# Patient Record
Sex: FEMALE | Race: WHITE | NOT HISPANIC OR LATINO | Employment: OTHER | URBAN - METROPOLITAN AREA
[De-identification: names, ages, dates, MRNs, and addresses within clinical notes are randomized per-mention and may not be internally consistent; named-entity substitution may affect disease eponyms.]

---

## 2021-04-08 DIAGNOSIS — Z23 ENCOUNTER FOR IMMUNIZATION: ICD-10-CM

## 2022-04-18 ENCOUNTER — EVALUATION (OUTPATIENT)
Dept: PHYSICAL THERAPY | Facility: CLINIC | Age: 72
End: 2022-04-18
Payer: COMMERCIAL

## 2022-04-18 DIAGNOSIS — M92.62 HAGLUND'S DEFORMITY OF LEFT HEEL: Primary | ICD-10-CM

## 2022-04-18 PROCEDURE — 97161 PT EVAL LOW COMPLEX 20 MIN: CPT

## 2022-04-18 PROCEDURE — 97110 THERAPEUTIC EXERCISES: CPT

## 2022-04-18 NOTE — LETTER
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        Dear Dr Damian Antis: Thank you for your recent referral of Deacon Ledesma  Please review the attached evaluation summary from Lenora's recent visit  Please verify that you agree with the plan of care by signing the attached order  If you have any questions or concerns, please do not hesitate to call  I sincerely appreciate the opportunity to share in the care of one of your patients and hope to have another opportunity to work with you in the near future  Sincerely,    Nikia Tyson, PT      Referring Provider:      I certify that I have read the below Plan of Care and certify the need for these services furnished under this plan of treatment while under my care  Radamesdion Villavicenciofly, [address]  [address] [place] [address]  Via Fax: [phone]          PT Evaluation     Today's date: 2022  Patient name: Deacon Ledesma  : 1950  MRN: [id number]  Referring provider: Curtis Ochoa DPM  Dx:   Encounter Diagnosis     ICD-10-CM    1  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                   Assessment  Assessment details: Abhilash Luz is a 74 yo female presenting with complaints of L heel pain  Pt demonstrates decreased L ankle strength in all planes, full but painful L ankle A/PROM, and WBing intolerance/abnormal gait leading to limitations with long duration standing, walking, stair navigation, bending, golfing and ADLs  Pt's clinical presentation aligns with dx of Tri's Deformity  Pt would benefit from skilled physical therapy interventions in order to address the stated impairments, decrease pain with function and increase activity tolerance in order to improve quality of life   No further referral appears necessary at this time based on examination results  Prognosis is good given HEP compliance and PT 1-2/wk   Positive prognostic indicators include positive attitude toward recovery  Please contact me if you have any questions or recommendations  Thank you for the opportunity to share in Lenora's care      Impairments: abnormal gait, abnormal or restricted ROM, activity intolerance, impaired physical strength, lacks appropriate home exercise program, pain with function and weight-bearing intolerance    Symptom irritability: lowBarriers to therapy: None  Understanding of Dx/Px/POC: good   Prognosis: good    Goals  Short Term Goals:  Pt will report decreased levels of pain by at least 2 subjective ratings in 4 weeks  Pt will demonstrate improved pain free ROM by at least 10 degrees in 4 weeks  Pt will demonstrate improved strength by ½ grade in 4 weeks  Pt will be able to walk pain free for 10 minutes in 4 weeks    Long Term Goals:  Pt will be independent with HEP in 8 weeks  Pt will be pain free with ADL's in 8 weeks  Pt will be able to walk pain free for 1 hour in 8 weeks      Plan  Patient would benefit from: skilled physical therapy  Referral necessary: No  Planned therapy interventions: balance/weight bearing training, body mechanics training, functional ROM exercises, gait training, graded activity, graded exercise, home exercise program, joint mobilization, manual therapy, neuromuscular re-education, patient education, strengthening, stretching, therapeutic activities and therapeutic exercise  Frequency: 2x week  Duration in weeks: 12  Plan of Care beginning date: 4/18/2022  Plan of Care expiration date: 7/11/2022  Treatment plan discussed with: patient        Subjective Evaluation    History of Present Illness  Date of onset: 12/1/2021  Mechanism of injury: trauma  Mechanism of injury: Pt slipped and fell on stairs while wearing heels in December which triggered onset of Tri's deformity and pain in L posterolateral heel with WBing  Pt has pain with long duration standing, walking, stairs, golfing, ADLs  No pain at rest            Recurrent probem    Quality of life: good    Pain  Current pain ratin  At best pain ratin  At worst pain ratin  Location: Posterolateral aspect of L heel  Quality: sharp  Relieving factors: rest and medications  Aggravating factors: standing, stair climbing and walking  Progression: no change    Social Support  Steps to enter house: yes  Stairs in house: yes   Lives in: multiple-level home  Lives with: spouse    Employment status: not working  Exercise history: Walking, golfing, water aerobics    Treatments  Previous treatment: immobilization  Patient Goals  Patient goals for therapy: decreased edema, decreased pain, increased motion, increased strength, independence with ADLs/IADLs and return to sport/leisure activities          Objective     Observations   Left Ankle/Foot   Positive for deformity  Additional Observation Details  Visible Tri's deformity    Tenderness   Left Ankle/Foot   Tenderness in the Achilles insertion  Right Ankle/Foot   No tenderness in the Achilles insertion       Additional Tenderness Details  Lateral calcaneus    Active Range of Motion   Left Ankle/Foot   Normal active range of motion  Dorsiflexion (ke): with pain    Right Ankle/Foot   Normal active range of motion    Passive Range of Motion   Left Ankle/Foot    Dorsiflexion (ke): 25 degrees with pain  Plantar flexion: 45 degrees   Inversion: 30 degrees   Eversion: 20 degrees     Right Ankle/Foot    Dorsiflexion (ke): 23 degrees   Plantar flexion: 45 degrees   Inversion: 29 degrees   Eversion: 22 degrees     Strength/Myotome Testing     Left Hip   Planes of Motion   Flexion: 4+  Abduction: 4+  Adduction: 4+    Right Hip   Planes of Motion   Flexion: 4+  Abduction: 4+  Adduction: 4+    Left Knee   Flexion: 4+  Extension: 4+    Right Knee   Flexion: 4+  Extension: 4+    Left Ankle/Foot Dorsiflexion: 4  Plantar flexion: 4  Inversion: 4  Eversion: 4  Great toe extension: 4+    Right Ankle/Foot   Dorsiflexion: 4+  Plantar flexion: 4+  Inversion: 4+  Eversion: 4+  Great toe extension: 4+    Ambulation     Ambulation: Level Surfaces   Ambulation without assistive device: independent    Ambulation: Stairs     Additional Stairs Ambulation Details  Non reciprocal, foot flat contact    Observational Gait   Gait: antalgic   Left foot contact pattern: foot flat    Additional Observational Gait Details  Decreased L toe off    Functional Assessment      Squat    Left within functional limits and right within functional limits                Precautions: None    Clinical dx: Tri's Deformity  Manuals 4/18            L ankle PROM                                                    Neuro Re-Ed             Arch lift             Towel scrunch             BAPS board             SLS                                                    Ther Ex             L ankle TB pf/inv/ev  GTB 3x10 HEP            Seated DF stretch 10x10" HEP            Standing DF stretch             Heel raises 3x10 HEP                                                                Ther Activity                                       Gait Training                                       Modalities

## 2022-04-18 NOTE — PROGRESS NOTES
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                   Assessment  Assessment details: Keisha Guzman is a 74 yo female presenting with complaints of L heel pain  Pt demonstrates decreased L ankle strength in all planes, full but painful L ankle A/PROM, and WBing intolerance/abnormal gait leading to limitations with long duration standing, walking, stair navigation, bending, golfing and ADLs  Pt's clinical presentation aligns with dx of Tri's Deformity  Pt would benefit from skilled physical therapy interventions in order to address the stated impairments, decrease pain with function and increase activity tolerance in order to improve quality of life  No further referral appears necessary at this time based on examination results  Prognosis is good given HEP compliance and PT 1-2/wk   Positive prognostic indicators include positive attitude toward recovery  Please contact me if you have any questions or recommendations  Thank you for the opportunity to share in Lenora's care      Impairments: abnormal gait, abnormal or restricted ROM, activity intolerance, impaired physical strength, lacks appropriate home exercise program, pain with function and weight-bearing intolerance    Symptom irritability: lowBarriers to therapy: None  Understanding of Dx/Px/POC: good   Prognosis: good    Goals  Short Term Goals:  Pt will report decreased levels of pain by at least 2 subjective ratings in 4 weeks  Pt will demonstrate improved pain free ROM by at least 10 degrees in 4 weeks  Pt will demonstrate improved strength by ½ grade in 4 weeks  Pt will be able to walk pain free for 10 minutes in 4 weeks    Long Term Goals:  Pt will be independent with HEP in 8 weeks  Pt will be pain free with ADL's in 8 weeks  Pt will be able to walk pain free for 1 hour in 8 weeks      Plan  Patient would benefit from: skilled physical therapy  Referral necessary: No  Planned therapy interventions: balance/weight bearing training, body mechanics training, functional ROM exercises, gait training, graded activity, graded exercise, home exercise program, joint mobilization, manual therapy, neuromuscular re-education, patient education, strengthening, stretching, therapeutic activities and therapeutic exercise  Frequency: 2x week  Duration in weeks: 12  Plan of Care beginning date: 2022  Plan of Care expiration date: 2022  Treatment plan discussed with: patient        Subjective Evaluation    History of Present Illness  Date of onset: 2021  Mechanism of injury: trauma  Mechanism of injury: Pt slipped and fell on stairs while wearing heels in December which triggered onset of Tri's deformity and pain in L posterolateral heel with WBing  Pt has pain with long duration standing, walking, stairs, golfing, ADLs  No pain at rest            Recurrent probem    Quality of life: good    Pain  Current pain ratin  At best pain ratin  At worst pain ratin  Location: Posterolateral aspect of L heel  Quality: sharp  Relieving factors: rest and medications  Aggravating factors: standing, stair climbing and walking  Progression: no change    Social Support  Steps to enter house: yes  Stairs in house: yes   Lives in: multiple-level home  Lives with: spouse    Employment status: not working  Exercise history: Walking, golfing, water aerobics    Treatments  Previous treatment: immobilization  Patient Goals  Patient goals for therapy: decreased edema, decreased pain, increased motion, increased strength, independence with ADLs/IADLs and return to sport/leisure activities          Objective     Observations   Left Ankle/Foot   Positive for deformity       Additional Observation Details  Visible Tri's deformity    Tenderness   Left Ankle/Foot   Tenderness in the Achilles insertion  Right Ankle/Foot   No tenderness in the Achilles insertion  Additional Tenderness Details  Lateral calcaneus    Active Range of Motion   Left Ankle/Foot   Normal active range of motion  Dorsiflexion (ke): with pain    Right Ankle/Foot   Normal active range of motion    Passive Range of Motion   Left Ankle/Foot    Dorsiflexion (ke): 25 degrees with pain  Plantar flexion: 45 degrees   Inversion: 30 degrees   Eversion: 20 degrees     Right Ankle/Foot    Dorsiflexion (ke): 23 degrees   Plantar flexion: 45 degrees   Inversion: 29 degrees   Eversion: 22 degrees     Strength/Myotome Testing     Left Hip   Planes of Motion   Flexion: 4+  Abduction: 4+  Adduction: 4+    Right Hip   Planes of Motion   Flexion: 4+  Abduction: 4+  Adduction: 4+    Left Knee   Flexion: 4+  Extension: 4+    Right Knee   Flexion: 4+  Extension: 4+    Left Ankle/Foot   Dorsiflexion: 4  Plantar flexion: 4  Inversion: 4  Eversion: 4  Great toe extension: 4+    Right Ankle/Foot   Dorsiflexion: 4+  Plantar flexion: 4+  Inversion: 4+  Eversion: 4+  Great toe extension: 4+    Ambulation     Ambulation: Level Surfaces   Ambulation without assistive device: independent    Ambulation: Stairs     Additional Stairs Ambulation Details  Non reciprocal, foot flat contact    Observational Gait   Gait: antalgic   Left foot contact pattern: foot flat    Additional Observational Gait Details  Decreased L toe off    Functional Assessment      Squat    Left within functional limits and right within functional limits                Precautions: None    Clinical dx: Tri's Deformity  Manuals 4/18            L ankle PROM                                                    Neuro Re-Ed             Arch lift             Towel scrunch             BAPS board             SLS                                                    Ther Ex             L ankle TB pf/inv/ev  GTB 3x10 HEP            Seated DF stretch 10x10" HEP            Standing DF stretch Heel raises 3x10 HEP                                                                Ther Activity                                       Gait Training                                       Modalities

## 2022-04-25 ENCOUNTER — APPOINTMENT (OUTPATIENT)
Dept: PHYSICAL THERAPY | Facility: CLINIC | Age: 72
End: 2022-04-25
Payer: COMMERCIAL

## 2022-05-02 ENCOUNTER — OFFICE VISIT (OUTPATIENT)
Dept: PHYSICAL THERAPY | Facility: CLINIC | Age: 72
End: 2022-05-02
Payer: COMMERCIAL

## 2022-05-02 DIAGNOSIS — M92.62 HAGLUND'S DEFORMITY OF LEFT HEEL: Primary | ICD-10-CM

## 2022-05-02 PROCEDURE — 97140 MANUAL THERAPY 1/> REGIONS: CPT

## 2022-05-02 PROCEDURE — 97112 NEUROMUSCULAR REEDUCATION: CPT

## 2022-05-02 PROCEDURE — 97110 THERAPEUTIC EXERCISES: CPT

## 2022-05-02 NOTE — PROGRESS NOTES
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                   Subjective: Pt reports foot is feeling improved since IE  States HEP compliance  States stretching provides most pain relief  States she feels confident performing HEP independently at home and would be comfortable d/c PT at this time  Objective: See treatment diary below      Assessment: Updated HEP provided including progression into neuromuscular control training of intrinsic foot musculature  Pt is independent with HEP and appropriate for d/c at this time  Plan: Continue per plan of care  Progress treatment as tolerated         Precautions: None    Clinical dx: Tri's Deformity  Manuals 2           L ankle PROM  10 min                                                  Neuro Re-Ed             Arch lift  3x10 HEP           Towel scrunch  3' HEP           BAPS board             LLE SLS  2x30"                                                  Ther Ex             L ankle TB pf/inv/ev  [id number] HEP GTB 3x10           Seated DF stretch 10x10" HEP 10x10"           Standing DF stretch             Heel raises 3x10 HEP 3x10                                                               Ther Activity                                       Gait Training                                       Modalities

## 2023-10-05 ENCOUNTER — HOSPITAL ENCOUNTER (OUTPATIENT)
Dept: RADIOLOGY | Facility: HOSPITAL | Age: 73
End: 2023-10-05
Payer: COMMERCIAL

## 2023-10-05 ENCOUNTER — TELEPHONE (OUTPATIENT)
Dept: OBGYN CLINIC | Facility: CLINIC | Age: 73
End: 2023-10-05

## 2023-10-05 ENCOUNTER — OFFICE VISIT (OUTPATIENT)
Dept: OBGYN CLINIC | Facility: CLINIC | Age: 73
End: 2023-10-05
Payer: COMMERCIAL

## 2023-10-05 VITALS
BODY MASS INDEX: 25.87 KG/M2 | DIASTOLIC BLOOD PRESSURE: 72 MMHG | OXYGEN SATURATION: 96 % | HEIGHT: 67 IN | HEART RATE: 97 BPM | SYSTOLIC BLOOD PRESSURE: 109 MMHG | WEIGHT: 164.8 LBS

## 2023-10-05 DIAGNOSIS — M25.551 PAIN IN RIGHT HIP: ICD-10-CM

## 2023-10-05 DIAGNOSIS — M25.551 PAIN IN RIGHT HIP: Primary | ICD-10-CM

## 2023-10-05 DIAGNOSIS — M16.11 PRIMARY OSTEOARTHRITIS OF RIGHT HIP: ICD-10-CM

## 2023-10-05 PROCEDURE — 99203 OFFICE O/P NEW LOW 30 MIN: CPT | Performed by: PHYSICIAN ASSISTANT

## 2023-10-05 PROCEDURE — 73502 X-RAY EXAM HIP UNI 2-3 VIEWS: CPT

## 2023-10-05 RX ORDER — ESCITALOPRAM OXALATE 20 MG/1
20 TABLET ORAL DAILY
COMMUNITY
Start: 2023-09-26

## 2023-10-05 NOTE — TELEPHONE ENCOUNTER
Tyrone Scales,     This patient was seen by Beverlee Brittle today and was referred for USGI. I schedule the patient a NP appt with Dr Alba Jenkins and explained that it would be a Consultati not the USGI.  The patient would like you to reach out to discuss more and to see how soon she would get the USGI    Thank you     [phone]

## 2023-10-05 NOTE — PROGRESS NOTES
Assessment/Plan   Diagnoses and all orders for this visit:    Pain in right hip    Follow up with Dr. Parvez Marshall or Dr. Magdalena Halsted for an US guided hip injection            Subjective   Patient ID: Viridiana Brush is a 72 y.o. female. Vitals:    10/05/23 1331   BP: 109/72   Pulse: 97   SpO2: 96%     79yo female comes in for an evaluation of her right hip. For several months, she has been having pain deep in the right anterior hip/groin. This is worst when she wakes up in the am and then improves throughout the day. The pain is dull in character, mild in severity, pain does not radiate and is not associated with numbness. The following portions of the patient's history were reviewed and updated as appropriate: allergies, current medications, past family history, past medical history, past social history, past surgical history and problem list.    Review of Systems  Ortho Exam  History reviewed. No pertinent past medical history. History reviewed. No pertinent surgical history. No family history on file. Social History     Occupational History   • Not on file   Tobacco Use   • Smoking status: Never   • Smokeless tobacco: Never   Vaping Use   • Vaping Use: Never used   Substance and Sexual Activity   • Alcohol use: Not on file   • Drug use: Not on file   • Sexual activity: Not on file       Review of Systems   Constitutional: Negative. HENT: Negative. Eyes: Negative. Respiratory: Negative. Cardiovascular: Negative. Gastrointestinal: Negative. Endocrine: Negative. Genitourinary: Negative. Musculoskeletal: As below. .   Allergic/Immunologic: Negative. Neurological: Negative. Hematological: Negative. Psychiatric/Behavioral: Negative. Objective   Physical Exam      I have personally reviewed pertinent films in PACS and my interpretation is no acute displaced fracture on xray.     · Constitutional: Awake, Alert, Oriented  · Eyes: EOMI  · Psych: Mood and affect appropriate  · Heart: regular rate   · Lungs: No audible wheezing  · Abdomen: No guarding  · Lymph: no lymphedema      • right Hip:  - Appearance  • No swelling, discoloration, deformity, or ecchymosis  - Palpation  • No tenderness about the SI joint, iliac crest, anterior hip, or greater trochanter  - ROM  • Flexion: 100, ER: 40, IR: 20 and Abduction: 30  - Special Tests  • Straight leg raise negative No pain with log rolling  - Motor  • normal 5/5 in all planes  - NVI distally

## 2023-10-20 ENCOUNTER — OFFICE VISIT (OUTPATIENT)
Dept: OBGYN CLINIC | Facility: CLINIC | Age: 73
End: 2023-10-20

## 2023-10-20 VITALS
BODY MASS INDEX: 25.74 KG/M2 | HEART RATE: 78 BPM | HEIGHT: 67 IN | DIASTOLIC BLOOD PRESSURE: 73 MMHG | WEIGHT: 164 LBS | SYSTOLIC BLOOD PRESSURE: 121 MMHG

## 2023-10-20 DIAGNOSIS — M25.551 PAIN OF RIGHT HIP: ICD-10-CM

## 2023-10-20 DIAGNOSIS — M25.551 GREATER TROCHANTERIC PAIN SYNDROME OF RIGHT LOWER EXTREMITY: Primary | ICD-10-CM

## 2023-10-20 RX ORDER — MELOXICAM 15 MG/1
15 TABLET ORAL DAILY PRN
Qty: 60 TABLET | Refills: 0 | Status: SHIPPED | OUTPATIENT
Start: 2023-10-20

## 2023-10-20 NOTE — PATIENT INSTRUCTIONS
Rules for limiting activity by pain symptoms:      -You can work through some pain, but keep it at a level from which you are distractible. Pain should not exceed 3/10 in severity.   -Do not change your biomechanics / form with your activity. This can lead to further injury.   -If you pay for your activity later with substantial symptom exacerbation, you are not yet ready for that level of exertion. You can obtain diclofenac cream/gel/ointment over the counter. Many brands make this medication and they include Voltaren, Aspercreme and Aleve. You can use this up to 3 times per day. It is best to wash the area with water, pat dry and then apply. The cream absorbs better after this. Be careful not to let any pets or children get in contact with the medication as it can be harmful to them. If you develop a skin reaction, stop using the cream.     You will be starting physical therapy. It is important to do home exercises as given by your physical therapist as you go through PT to speed up your recovery. Physical therapy addresses the problems that are causing your pain, instead of covering them up, as some other treatment options do.

## 2023-10-20 NOTE — PROGRESS NOTES
1. 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 Pain of right hip  Ambulatory referral to Physical Therapy    meloxicam (MOBIC) 15 mg tablet        Orders Placed This Encounter   Procedures    Ambulatory referral to Physical Therapy        Impression:  Right hip pain likely secondary to greater trochanteric pain syndrome/piriformis syndrome. We discussed different treatment options and decided to proceed with physical therapy along with diclofenac gel and meloxicam.  We briefly discussed a greater trochanteric injection and can consider this on follow-up visit if still symptomatic. She can continue to be as active as tolerated. I will see her back in 6 weeks if needed. Imaging Studies (I personally reviewed images in PACS and report):  Right hip x-rays most recent to this encounter reviewed. These images show no acute osseous abnormalities or significant degeneration. Minimal, if any, hip osteoarthritis. The patient's pertinent emergency department/urgent care/referring physician's notes were reviewed. Return in about 6 weeks (around 12/1/2023). Patient is in agreement with the above plan. HPI:  Lady Rodriguez is a 72 y.o. female  who presents for evaluation of   Chief Complaint   Patient presents with    Right Hip - Pain     Patient has had right hip pain for the last year. Patient does not have numbness down the leg. Has not done any PT would like to start pt before doing any type of injection. Patient has started taking tumric, mag,calcium for this issue and is slowly starting to help. Patient is using icy hot when needed for pain         Onset/Mechanism: Pain in her hip and groin that is worse in the morning. Location: As above. Radiation: Denies. Provocative: Worse in the morning. Severity: Painful. Associated Symptoms: Denies. Treatment so far: As above.     She is very active and plays golf along with tennis. Following history reviewed and updated:  History reviewed. No pertinent past medical history. History reviewed. No pertinent surgical history. Social History   Social History     Substance and Sexual Activity   Alcohol Use None     Social History     Substance and Sexual Activity   Drug Use Not on file     Social History     Tobacco Use   Smoking Status Never   Smokeless Tobacco Never     History reviewed. No pertinent family history. No Known Allergies     Constitutional:  /73   Pulse 78   Ht 5' 7" (1.702 m)   Wt 74.4 kg (164 lb)   BMI 25.69 kg/m²    General: NAD. Eyes: Anicteric sclerae. Neck: Supple. Lungs: Unlabored breathing. Cardiovascular: No lower extremity edema. Skin: Intact without erythema. Neurologic: Sensation intact to light touch. Psychiatric: Mood and affect are appropriate. Right Hip Exam     Range of Motion   Internal rotation:  normal     Tests   Kelvin: negative    Other   Erythema: absent  Scars: absent  Sensation: normal  Pulse: present    Comments:  No hip intra-articular signs on examination.              Procedures

## 2023-11-03 ENCOUNTER — EVALUATION (OUTPATIENT)
Dept: PHYSICAL THERAPY | Facility: CLINIC | Age: 73
End: 2023-11-03
Payer: COMMERCIAL

## 2023-11-03 DIAGNOSIS — M25.551 RIGHT HIP PAIN: Primary | ICD-10-CM

## 2023-11-03 DIAGNOSIS — M25.551 GREATER TROCHANTERIC PAIN SYNDROME OF RIGHT LOWER EXTREMITY: ICD-10-CM

## 2023-11-03 DIAGNOSIS — M25.551 PAIN OF RIGHT HIP: ICD-10-CM

## 2023-11-03 PROCEDURE — 97161 PT EVAL LOW COMPLEX 20 MIN: CPT | Performed by: PHYSICAL THERAPIST

## 2023-11-03 NOTE — PROGRESS NOTES
PT Evaluation    Today's date: 11/3/2023   Patient name: Delora Schlatter  : 1950  MRN: [id number]  Referring provider: Jes Bedolla DO  Dx:   Encounter Diagnosis     ICD-10-CM    1. Right hip pain  M25.551               Subjective Evaluation     History of Present Illness    Patient presents with c/o R lateral hip and groin pain over time in the last 3-4 months. She is very active and plays golf, tennis, pickleball, and other things. She saw ortho and chiropractor yesterday. She is getting adjustments which she feels better and she thinks she may have to go more often. She has been taking turmeric and it helps her shooting pain. She did have a bad fall 6 years back taking the dogs out and she missed the last step and fell flat on her back. Neuro signs: none  Bowel and bladder sxs: none  Red flags: none        Pain  At best pain rating:3  At worst pain ratin  Location: R lateral glut hip and groin, B glut, LBP  Quality: uncomfortable  Relieving factors: stretching while sitting, walk, wearing supportive belt, cream  Aggravating factors: sit to stand after sitting a while, getting out of car, lying on R side      Patient Goals  Patient goals for therapy: to get Pain relief     STGs  1. Decrease pain by 20% in 2-4 weeks to improve standing tolerance. 2. Improve hip ROM by 10 degrees in 2-4 weeks to improve sit to stand. 3. Improve hip strength by 1/3 grade in 2-4 weeks to improve golf/pickleball. LTGs  1. Decrease pain by 60% in 6-8 weeks. 2. Improve walking tolerance to >30 minutes in 6-8 weeks to perform community ambulation. 3. Perform job/dressing/showering activities independently without pain in 6-8 weeks. 4. Improve stairs tolerance to 1 flight  in 8 weeks.        Objective Measurements:    Observation:  Heel/toe walk:  Balance:  Gait:  Squat: knees caving in   Sensation:-  Myotomes:-    Slump: -SLR: Asra Filbert: -   Ely’s: 120   NADEEM:WFL  Palpation: piriformis B TTP    Lumbar ROM L R Strength knee L R   Flex  wfl  Flex 5 5   Ext Min segovia improved c repetition  Ext 5 5   Rot WFL WFL      EIL 10x       SB WFL WFL      Hip A/PROM        Flex  /  / Flex 5 5   ER at 90   ER     IR at 90   IR     Abd   Abd 3- 3+   Ext   Ext 3+ 4-           Knee A/PROM   Ankle     Flex   DF 5 5   Ext   PF           Assessment:    Cathie Peterson is a pleasant 72 y.o. female who presents with primary movement impairment diagnosis of hip and low back pain due to decreased hip abductor/extensor weakness. This is limiting  her ability to sit to stand, sleep on R side and play sports s pain. The patient's greatest concern is improving function and pain. No further referral appears necessary at this time based upon examination results. Etiologic factors include repetitive activity . Negative prognostic indicators:none. Positive prognostic indicators: good activity level. Please contact me if you have any further questions or recommendations. Thank you very much for the kind referral.        Plan  Patient would benefit from:Skilled physical therapy  Planned therapy interventions: manual therapy, neuromuscular re-education, stretching, strengthening, therapeutic activities, therapeutic exercise, patient education, home exercise program, and activity modification.     Frequency: 1x week  Duration in weeks: 6  Treatment plan discussed with: patient             Goals: Patient's goal is to improve pain c sit to stand, sleeping on R side, and return to sport    Precautions: none  Dx: R>L hip pain- glut med weakness  Give band nv  Access Code: [email or web address]       Daily Treatment Diary     Manuals 11/3/2023                                            Ther Ex         S/l clamshells 20x        sidesteps 5 laps        Hip abd         Hip ext         SL RDL                                             Neuro Re-ed         Hip abd iso         Supine bridge pball         Lumbar ext 10x                          Ther Activity         Side stepup                           Gait Training                           Modalities

## 2023-11-14 ENCOUNTER — APPOINTMENT (OUTPATIENT)
Dept: PHYSICAL THERAPY | Facility: CLINIC | Age: 73
End: 2023-11-14
Payer: COMMERCIAL

## 2023-11-20 ENCOUNTER — APPOINTMENT (OUTPATIENT)
Dept: PHYSICAL THERAPY | Facility: CLINIC | Age: 73
End: 2023-11-20
Payer: COMMERCIAL

## 2023-11-20 DIAGNOSIS — M25.551 GREATER TROCHANTERIC PAIN SYNDROME OF RIGHT LOWER EXTREMITY: ICD-10-CM

## 2023-11-20 DIAGNOSIS — M25.551 RIGHT HIP PAIN: Primary | ICD-10-CM

## 2023-11-20 DIAGNOSIS — M25.551 PAIN OF RIGHT HIP: ICD-10-CM

## 2023-11-20 NOTE — PROGRESS NOTES
Daily Note     Today's date: 2023  Patient name: Cathie Peterson  : 1950  MRN: [id number]  Referring provider: Matt Odell DO  Dx:   Encounter Diagnosis     ICD-10-CM    1. Right hip pain  M25.551       2. Pain of right hip  M25.551       3. Greater trochanteric pain syndrome of right lower extremity  M25.551                      Subjective: ***      Objective: See treatment diary below      Assessment: Tolerated treatment {Tolerated treatment :0849035004}.  Patient {assessment:7061545997}      Plan: {PLAN:0449821763}     Goals: Patient's goal is to improve pain c sit to stand, sleeping on R side, and return to sport    Precautions: none  Dx: R>L hip pain- glut med weakness  Give band nv  Access Code: [email or web address]       Daily Treatment Diary     Manuals 11/3/2023                                            Ther Ex         S/l clamshells 20x        sidesteps 5 laps        Hip abd         Hip ext         SL RDL                                             Neuro Re-ed         Hip abd iso         Supine bridge pball         Lumbar ext 10x                          Ther Activity         Side stepup                           Gait Training                           Modalities

## 2023-11-28 ENCOUNTER — APPOINTMENT (OUTPATIENT)
Dept: PHYSICAL THERAPY | Facility: CLINIC | Age: 73
End: 2023-11-28
Payer: COMMERCIAL

## 2023-12-04 ENCOUNTER — TELEPHONE (OUTPATIENT)
Age: 73
End: 2023-12-04

## 2023-12-04 NOTE — TELEPHONE ENCOUNTER
Caller: Patient    Doctor: [place]    Reason for call: Patient canceled appt 12/4 and stated she would like Dr. Alma Chung to know that PT is helping right hip, but if pain persists, she will reschedule.     Call back#: [phone]

## 2023-12-11 ENCOUNTER — EVALUATION (OUTPATIENT)
Dept: PHYSICAL THERAPY | Facility: CLINIC | Age: 73
End: 2023-12-11
Payer: COMMERCIAL

## 2023-12-11 DIAGNOSIS — M25.551 PAIN OF RIGHT HIP: ICD-10-CM

## 2023-12-11 DIAGNOSIS — M54.16 LUMBAR RADICULOPATHY: Primary | ICD-10-CM

## 2023-12-11 PROCEDURE — 97110 THERAPEUTIC EXERCISES: CPT | Performed by: PHYSICAL THERAPIST

## 2023-12-11 PROCEDURE — 97161 PT EVAL LOW COMPLEX 20 MIN: CPT | Performed by: PHYSICAL THERAPIST

## 2023-12-11 NOTE — PROGRESS NOTES
PT Evaluation     Today's date: 2023  Patient name: Lakesha Mayen  : 1950  MRN: [id number]  Referring provider: Cruzito Rolon DO       Dx:   Encounter Diagnosis     ICD-10-CM    1. Lumbar radiculopathy  M54.16       2. Pain of right hip  M25.551                      Assessment  Assessment details: Lakesha Mayen is a 72 y.o. female who presents with pain and mild strength and ROM deficits of the L/S. Due to these impairments, patient has difficulty performing a/iadls and recreational activities. Patient demonstrates signs and symptoms consistent with lumbar radiculopathy. Patient states she wishes to perform exercises independently at home at this time and will contact the office if she requires further PT treatment in the future. Understanding of Dx/Px/POC: good   Prognosis: good    Plan  Planned therapy interventions: home exercise program        Subjective Evaluation    History of Present Illness  Mechanism of injury: Patient refers to PT with c/o pain in her bilateral buttocks areas and radiating to bilateral posterior thighs and lateral thighs proximal to the knee joints (states pain is greater on the left LE than the right LE). Patient received X-rays of her right hip on 10/5/23 which revealed mild right hip OA; no lytic or blastic osseous lesion; degenerative changes pubic symphysis and visualized lower lumbar spine. Patient received PT evaluation in this office for her right hip on 11/3/23. Patient states no instability of bilateral LE's. Patient c/o pain when getting transferring from sitting to standing. Patient c/o pain with forward bending activities.      Pain  Current pain ratin  At best pain ratin  At worst pain ratin          Objective     Neurological Testing     Sensation     Lumbar   Left   Intact: light touch    Right   Intact: light touch    Active Range of Motion     Lumbar   Flexion:  Restriction level: minimal  Extension:  Restriction level: minimal  Left lateral flexion:  Restriction level: minimal  Right lateral flexion:  Restriction level: minimal    Strength/Myotome Testing     Left Hip   Planes of Motion   Flexion: 3+  Extension: 4-  Abduction: 4-  Adduction: 4-    Right Hip   Planes of Motion   Flexion: 4-  Extension: 4-  Abduction: 4-  Adduction: 4-    Left Knee   Flexion: 4+ (pain)  Extension: 4+ (pain)    Right Knee   Flexion: 4+  Extension: 4+    Left Ankle/Foot   Dorsiflexion: 4+  Plantar flexion: 4+    Right Ankle/Foot   Dorsiflexion: 4+  Plantar flexion: 4+    General Comments:      Lumbar Comments  Repeated flexion in standing: No effect  Repeated extension in standing: No effect             Precautions: None      Manuals 12/11                                                                Neuro Re-Ed                                                                                                        Ther Ex             L/S ext in stand HEP            SLR flex HEP            SLR abd HEP            Bridges HEP                                                                Ther Activity                                       Gait Training                                       Modalities                                          HEP access code: [email or web address]

## 2024-05-31 ENCOUNTER — OFFICE VISIT (OUTPATIENT)
Dept: OBGYN CLINIC | Facility: CLINIC | Age: 74
End: 2024-05-31
Payer: COMMERCIAL

## 2024-05-31 VITALS
HEART RATE: 65 BPM | SYSTOLIC BLOOD PRESSURE: 142 MMHG | WEIGHT: 164 LBS | BODY MASS INDEX: 25.69 KG/M2 | DIASTOLIC BLOOD PRESSURE: 84 MMHG

## 2024-05-31 DIAGNOSIS — M25.551 PAIN OF RIGHT HIP: Primary | ICD-10-CM

## 2024-05-31 PROCEDURE — 20610 DRAIN/INJ JOINT/BURSA W/O US: CPT | Performed by: PHYSICAL MEDICINE & REHABILITATION

## 2024-05-31 PROCEDURE — 99213 OFFICE O/P EST LOW 20 MIN: CPT | Performed by: PHYSICAL MEDICINE & REHABILITATION

## 2024-05-31 RX ORDER — TRIAMCINOLONE ACETONIDE 40 MG/ML
80 INJECTION, SUSPENSION INTRA-ARTICULAR; INTRAMUSCULAR
Status: COMPLETED | OUTPATIENT
Start: 2024-05-31 | End: 2024-05-31

## 2024-05-31 RX ORDER — ROPIVACAINE HYDROCHLORIDE 5 MG/ML
10 INJECTION, SOLUTION EPIDURAL; INFILTRATION; PERINEURAL
Status: COMPLETED | OUTPATIENT
Start: 2024-05-31 | End: 2024-05-31

## 2024-05-31 RX ADMIN — ROPIVACAINE HYDROCHLORIDE 10 ML: 5 INJECTION, SOLUTION EPIDURAL; INFILTRATION; PERINEURAL at 14:15

## 2024-05-31 RX ADMIN — TRIAMCINOLONE ACETONIDE 80 MG: 40 INJECTION, SUSPENSION INTRA-ARTICULAR; INTRAMUSCULAR at 14:15

## 2024-05-31 NOTE — PROGRESS NOTES
1. Pain of right hip  Large joint arthrocentesis: R greater trochanteric bursa        Orders Placed This Encounter   Procedures    Large joint arthrocentesis: R greater trochanteric bursa      Impression:  Patient is here in follow up of right hip pain likely secondary to greater trochanteric pain syndrome/piriformis syndrome.  Treatment has included physical therapy along with diclofenac gel and meloxicam. Today we proceeded with a greater trochanteric injection.  She will continue HEP.      Imaging Studies (I personally reviewed images in PACS and report):  Right hip x-rays most recent to this encounter reviewed.  These images show no acute osseous abnormalities or significant degeneration.  Minimal, if any, hip osteoarthritis.    Return for 3-4 week f/u.    Patient is in agreement with the above plan.    HPI:  Lenora Jacinto is a 73 y.o. female  who presents in follow up.  Here for   Chief Complaint   Patient presents with    Right Hip - Pain, Follow-up       Since last visit: See above.    Following history reviewed and updated:  History reviewed. No pertinent past medical history.  History reviewed. No pertinent surgical history.  Social History   Social History     Substance and Sexual Activity   Alcohol Use None     Social History     Substance and Sexual Activity   Drug Use Not on file     Social History     Tobacco Use   Smoking Status Never   Smokeless Tobacco Never     History reviewed. No pertinent family history.  No Known Allergies     Constitutional:  /84   Pulse 65   Wt 74.4 kg (164 lb)   BMI 25.69 kg/m²    General: NAD.  Eyes: Clear sclerae.  ENT: No inflammation, lesion, or mass of lips.  No tracheal deviation.  Musculoskeletal: As mentioned below.  Integumentary: No visible rashes or skin lesions.  Pulmonary/Chest: Effort normal. No respiratory distress.   Neuro: CN's grossly intact, LIM.  Psych: Normal affect and judgement.  Vascular: WWP.    Right Hip Exam     Tenderness   The patient is  experiencing tenderness in the greater trochanter.    Other   Erythema: absent  Scars: absent  Sensation: normal  Pulse: present             Large joint arthrocentesis: R greater trochanteric bursa  Universal Protocol:  Procedure performed by: (Chaperone present)  Consent: Verbal consent obtained. Written consent not obtained.  Risks and benefits: risks, benefits and alternatives were discussed  Consent given by: patient  Timeout called at: 5/31/2024 2:24 PM.  Patient understanding: patient states understanding of the procedure being performed  Site marked: the operative site was marked  Radiology Images displayed and confirmed. If images not available, report reviewed: imaging studies available  Patient identity confirmed: verbally with patient  Supporting Documentation  Indications: pain   Procedure Details  Location: hip - R greater trochanteric bursa  Needle size: 20 G (20G 3.5'')  Ultrasound guidance: no  Approach: Lateral to medial approach.  Medications administered: 80 mg triamcinolone acetonide 40 mg/mL; 10 mL ropivacaine 0.5 %    Patient tolerance: patient tolerated the procedure well with no immediate complications  Dressing:  Sterile dressing applied    A modified tenotomy was performed by redirecting the needle in three directions and dispersing the medication equally in all three directions.    There was little to no resistance encountered during the injection.    Risks of this procedure include:    - Risk of bleeding since a needle is involved.  - Risk of infection (1/10,000 chance as per recent studies).  Signs/symptoms were discussed and they would prompt an urgent evaluation at an emergency department.  - Risk of pigmentation or skin dimpling in the skin (2-3% chance as per recent studies) from the steroid.  - Risk of increased pain from steroid flare (1% chance as per recent studies) that typically lasts 24-48 hours.  - Risk of increased blood sugars from the steroid medication that can last for a  few weeks.  If the patient is a diabetic or pre-diabetic, they were encouraged to closely monitor their blood sugars and discuss with PCP if elevated more than usual or if having symptoms.    The benefits outweigh the risks and so the procedure was completed.

## 2024-09-06 ENCOUNTER — OFFICE VISIT (OUTPATIENT)
Dept: OBGYN CLINIC | Facility: CLINIC | Age: 74
End: 2024-09-06
Payer: COMMERCIAL

## 2024-09-06 VITALS
WEIGHT: 164 LBS | BODY MASS INDEX: 25.74 KG/M2 | HEART RATE: 80 BPM | SYSTOLIC BLOOD PRESSURE: 126 MMHG | HEIGHT: 67 IN | DIASTOLIC BLOOD PRESSURE: 86 MMHG

## 2024-09-06 DIAGNOSIS — M25.551 PAIN OF RIGHT HIP: Primary | ICD-10-CM

## 2024-09-06 PROCEDURE — 20610 DRAIN/INJ JOINT/BURSA W/O US: CPT | Performed by: PHYSICAL MEDICINE & REHABILITATION

## 2024-09-06 PROCEDURE — 99213 OFFICE O/P EST LOW 20 MIN: CPT | Performed by: PHYSICAL MEDICINE & REHABILITATION

## 2024-09-06 RX ORDER — TRIAMCINOLONE ACETONIDE 40 MG/ML
80 INJECTION, SUSPENSION INTRA-ARTICULAR; INTRAMUSCULAR
Status: COMPLETED | OUTPATIENT
Start: 2024-09-06 | End: 2024-09-06

## 2024-09-06 RX ORDER — ROPIVACAINE HYDROCHLORIDE 5 MG/ML
10 INJECTION, SOLUTION EPIDURAL; INFILTRATION; PERINEURAL
Status: COMPLETED | OUTPATIENT
Start: 2024-09-06 | End: 2024-09-06

## 2024-09-06 RX ADMIN — TRIAMCINOLONE ACETONIDE 80 MG: 40 INJECTION, SUSPENSION INTRA-ARTICULAR; INTRAMUSCULAR at 10:00

## 2024-09-06 RX ADMIN — ROPIVACAINE HYDROCHLORIDE 10 ML: 5 INJECTION, SOLUTION EPIDURAL; INFILTRATION; PERINEURAL at 10:00

## 2024-09-06 NOTE — PROGRESS NOTES
"1. Pain of right hip  Large joint arthrocentesis: R greater trochanteric bursa    Ambulatory referral to Physical Therapy        Orders Placed This Encounter   Procedures    Large joint arthrocentesis: R greater trochanteric bursa    Ambulatory referral to Physical Therapy        Impression:  Patient is here in follow up of right hip pain likely secondary to greater trochanteric pain syndrome/piriformis syndrome.  Treatment has included physical therapy, GT injection along with diclofenac gel and meloxicam.  We repeated a greater trochanteric injection today.  She would benefit in restarting PT and home exercise program.  We will see her back if needed.     Imaging Studies (I personally reviewed images in PACS and report):  Right hip x-rays most recent to this encounter reviewed.  These images show no acute osseous abnormalities or significant degeneration.  Minimal, if any, hip osteoarthritis.    No follow-ups on file.    Patient is in agreement with the above plan.    HPI:  Lenora Jacinto is a 73 y.o. female  who presents in follow up.  Here for   Chief Complaint   Patient presents with    Right Hip - Pain, Follow-up       Since last visit: See above.    Following history reviewed and updated:  History reviewed. No pertinent past medical history.  History reviewed. No pertinent surgical history.  Social History   Social History     Substance and Sexual Activity   Alcohol Use None     Social History     Substance and Sexual Activity   Drug Use Not on file     Social History     Tobacco Use   Smoking Status Never   Smokeless Tobacco Never     History reviewed. No pertinent family history.  No Known Allergies     Constitutional:  /86   Pulse 80   Ht 5' 7\" (1.702 m)   Wt 74.4 kg (164 lb)   BMI 25.69 kg/m²    General: NAD.  Eyes: Clear sclerae.  ENT: No inflammation, lesion, or mass of lips.  No tracheal deviation.  Musculoskeletal: As mentioned below.  Integumentary: No visible rashes or skin " lesions.  Pulmonary/Chest: Effort normal. No respiratory distress.   Neuro: CN's grossly intact, LIM.  Psych: Normal affect and judgement.  Vascular: WWP.    Right Hip Exam     Tenderness   The patient is experiencing tenderness in the greater trochanter.    Other   Erythema: absent  Scars: absent  Sensation: normal  Pulse: present             Large joint arthrocentesis: R greater trochanteric bursa  Universal Protocol:  Procedure performed by: (Chaperone present)  Consent: Verbal consent obtained. Written consent not obtained.  Risks and benefits: risks, benefits and alternatives were discussed  Consent given by: patient  Timeout called at: 9/6/2024 10:07 AM.  Patient understanding: patient states understanding of the procedure being performed  Site marked: the operative site was marked  Radiology Images displayed and confirmed. If images not available, report reviewed: imaging studies available  Patient identity confirmed: verbally with patient  Supporting Documentation  Indications: pain   Procedure Details  Location: hip - R greater trochanteric bursa  Needle size: 20 G (20G 3.5'')  Ultrasound guidance: no  Approach: Lateral to medial approach.  Medications administered: 80 mg triamcinolone acetonide 40 mg/mL; 10 mL ropivacaine 0.5 %    Patient tolerance: patient tolerated the procedure well with no immediate complications  Dressing:  Sterile dressing applied    A modified tenotomy was performed by redirecting the needle in three directions and dispersing the medication equally in all three directions.    There was little to no resistance encountered during the injection.    Risks of this procedure include:    - Risk of bleeding since a needle is involved.  - Risk of infection (1/10,000 chance as per recent studies).  Signs/symptoms were discussed and they would prompt an urgent evaluation at an emergency department.  - Risk of pigmentation or skin dimpling in the skin (2-3% chance as per recent studies) from  the steroid.  - Risk of increased pain from steroid flare (1% chance as per recent studies) that typically lasts 24-48 hours.  - Risk of increased blood sugars from the steroid medication that can last for a few weeks.  If the patient is a diabetic or pre-diabetic, they were encouraged to closely monitor their blood sugars and discuss with PCP if elevated more than usual or if having symptoms.    The benefits outweigh the risks and so the procedure was completed.           Zygomaticofacial Flap Text: Given the location of the defect, shape of the defect and the proximity to free margins a zygomaticofacial flap was deemed most appropriate for repair.  Using a sterile surgical marker, the appropriate flap was drawn incorporating the defect and placing the expected incisions within the relaxed skin tension lines where possible. The area thus outlined was incised deep to adipose tissue with a #15 scalpel blade with preservation of a vascular pedicle.  The skin margins were undermined to an appropriate distance in all directions utilizing iris scissors.  The flap was then placed into the defect and anchored with interrupted buried subcutaneous sutures.

## 2024-09-30 ENCOUNTER — EVALUATION (OUTPATIENT)
Dept: PHYSICAL THERAPY | Facility: CLINIC | Age: 74
End: 2024-09-30
Payer: COMMERCIAL

## 2024-09-30 DIAGNOSIS — M25.551 PAIN OF RIGHT HIP: Primary | ICD-10-CM

## 2024-09-30 PROCEDURE — 97110 THERAPEUTIC EXERCISES: CPT | Performed by: PHYSICAL THERAPIST

## 2024-09-30 PROCEDURE — 97161 PT EVAL LOW COMPLEX 20 MIN: CPT | Performed by: PHYSICAL THERAPIST

## 2024-09-30 NOTE — PROGRESS NOTES
PT Evaluation     Today's date: 2024  Patient name: Lenora Jacinto  : 1950  MRN: [id number]  Referring provider: Radha Bob DO  Dx:   Encounter Diagnosis     ICD-10-CM    1. Pain of right hip  M25.551 Ambulatory referral to Physical Therapy                     Assessment  Impairments: abnormal or restricted ROM, impaired physical strength, lacks appropriate home exercise program, pain with function and activity limitations  Functional limitations: IADLs; Recreational activities  Symptom irritability: low    Assessment details: Patient is a 73 y.o.female referred to PT by Dr. Bob with a dx of R hip pain.  Patient reports episodic history of R hip pain.  She received an injection 3 months ago that gave her significant relief with return of the pain approximately 1 month ago.  She received another injection which gave her approximately 75% relief of her symptoms.  The pain complaints are aggravated by stair negotiation and prolonged sitting.  Clinical exam is consistent with R hip hypomobility and poor neuromuscular control contributing to her pain complaints.  No other referral appears necessary at this time.      Understanding of Dx/Px/POC: good     Prognosis: good    Goals  Short Term goals - 4 weeks  1.  Patient will be independent and compliant with a HEP.   2.  Patient will report a 50% decrease in pain complaints.     Long Term goals - 8 weeks  1.  Patient will report elimination of pain complaints.  2.  Patient will return to all IADLs without restriction.  3.  Patient will return to all recreational activities without restriction.        Plan  Patient would benefit from: skilled physical therapy    Planned therapy interventions: joint mobilization, manual therapy, nerve gliding, neuromuscular re-education, postural training, therapeutic exercise and home exercise program    Frequency: Every other week  Duration in weeks: 8  Plan of Care beginning date: 2024  Plan of Care expiration  date: 2024  Treatment plan discussed with: patient    Subjective Evaluation    History of Present Illness  Mechanism of injury: Episodic nature of R lateral hip pain          Recurrent probem    Quality of life: good    Patient Goals  Patient goals for therapy: decreased pain, increased motion, increased strength, independence with ADLs/IADLs and return to sport/leisure activities    Pain  Current pain ratin  At best pain ratin  At worst pain ratin  Location: R lateral hip  Aggravating factors: sitting and stair climbing (golf)  Progression: improved (Post injection)    Treatments  Previous treatment: injection treatment  Current treatment: physical therapy    Objective     Concurrent Complaints  Negative for night pain, disturbed sleep, bladder dysfunction, bowel dysfunction and saddle (S4) numbness    Postural Observations  Seated posture: fair  Correction of posture: has no consistent effect      Active Range of Motion     Lumbar   Normal active range of motion  Left Hip   Normal active range of motion    Right Hip   Normal active range of motion    Strength/Myotome Testing     Left Hip   Planes of Motion   Flexion: 4+  Extension: 4  Abduction: 3+    Right Hip   Planes of Motion   Flexion: 4+  Extension: 4  Abduction: 4    Tests     Left Hip   Positive OWEN.            Precautions: None      Manuals                                                                 Neuro Re-Ed                                                                                                        Ther Ex             Recheck OWEN                                                                                                        Ther Activity                                       Gait Training                                       Modalities

## 2024-10-24 ENCOUNTER — OFFICE VISIT (OUTPATIENT)
Dept: PHYSICAL THERAPY | Facility: CLINIC | Age: 74
End: 2024-10-24
Payer: COMMERCIAL

## 2024-10-24 DIAGNOSIS — M25.551 PAIN OF RIGHT HIP: Primary | ICD-10-CM

## 2024-10-24 PROCEDURE — 97110 THERAPEUTIC EXERCISES: CPT | Performed by: PHYSICAL THERAPIST
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
## 2025-01-03 ENCOUNTER — OFFICE VISIT (OUTPATIENT)
Dept: OBGYN CLINIC | Facility: CLINIC | Age: 75
End: 2025-01-03
Payer: COMMERCIAL

## 2025-01-03 VITALS — BODY MASS INDEX: 24.48 KG/M2 | WEIGHT: 156 LBS | HEIGHT: 67 IN

## 2025-01-03 DIAGNOSIS — M25.551 PAIN OF RIGHT HIP: Primary | ICD-10-CM

## 2025-01-03 PROCEDURE — 99213 OFFICE O/P EST LOW 20 MIN: CPT | Performed by: PHYSICAL MEDICINE & REHABILITATION

## 2025-01-03 PROCEDURE — 20610 DRAIN/INJ JOINT/BURSA W/O US: CPT | Performed by: PHYSICAL MEDICINE & REHABILITATION

## 2025-01-03 RX ORDER — TRIAMCINOLONE ACETONIDE 40 MG/ML
80 INJECTION, SUSPENSION INTRA-ARTICULAR; INTRAMUSCULAR
Status: COMPLETED | OUTPATIENT
Start: 2025-01-03 | End: 2025-01-03

## 2025-01-03 RX ORDER — ROPIVACAINE HYDROCHLORIDE 5 MG/ML
10 INJECTION, SOLUTION EPIDURAL; INFILTRATION; PERINEURAL
Status: COMPLETED | OUTPATIENT
Start: 2025-01-03 | End: 2025-01-03

## 2025-01-03 RX ADMIN — ROPIVACAINE HYDROCHLORIDE 10 ML: 5 INJECTION, SOLUTION EPIDURAL; INFILTRATION; PERINEURAL at 14:00

## 2025-01-03 RX ADMIN — TRIAMCINOLONE ACETONIDE 80 MG: 40 INJECTION, SUSPENSION INTRA-ARTICULAR; INTRAMUSCULAR at 14:00

## 2025-01-03 NOTE — PROGRESS NOTES
1. Pain of right hip          No orders of the defined types were placed in this encounter.       Impression:  Patient is here in follow up of right hip pain likely secondary to greater trochanteric pain syndrome/piriformis syndrome.  Treatment has included physical therapy, GT injection along with diclofenac gel and meloxicam.  We repeated a greater trochanteric injection today.  Continue with HEP after going through PT.  Patient will call if her symptoms returned.  If so, we will obtain an MRI of her right hip after going through extensive conservative treatment.     Imaging Studies (I personally reviewed images in PACS and report):  Right hip x-rays most recent to this encounter reviewed.  These images show no acute osseous abnormalities or significant degeneration.  Minimal, if any, hip osteoarthritis.    No follow-ups on file.    Patient is in agreement with the above plan.    HPI:  Lenora Jacinto is a 74 y.o. female  who presents in follow up.  Here for No chief complaint on file.      Since last visit: See above.    Following history reviewed and updated:  No past medical history on file.  No past surgical history on file.  Social History   Social History     Substance and Sexual Activity   Alcohol Use None     Social History     Substance and Sexual Activity   Drug Use Not on file     Social History     Tobacco Use   Smoking Status Never   Smokeless Tobacco Never     No family history on file.  No Known Allergies     Constitutional:  There were no vitals taken for this visit.   General: NAD.  Eyes: Clear sclerae.  ENT: No inflammation, lesion, or mass of lips.  No tracheal deviation.  Musculoskeletal: As mentioned below.  Integumentary: No visible rashes or skin lesions.  Pulmonary/Chest: Effort normal. No respiratory distress.   Neuro: CN's grossly intact, LIM.  Psych: Normal affect and judgement.  Vascular: WWP.    Right Hip Exam     Tenderness   The patient is experiencing tenderness in the greater  trochanter.    Other   Erythema: absent  Scars: absent  Sensation: normal  Pulse: present             Large joint arthrocentesis: R greater trochanteric bursa  Universal Protocol:  Procedure performed by: (Chaperone present)  Consent: Verbal consent obtained. Written consent not obtained.  Risks and benefits: risks, benefits and alternatives were discussed  Consent given by: patient  Timeout called at: 1/3/2025 2:27 PM.  Patient understanding: patient states understanding of the procedure being performed  Site marked: the operative site was marked  Radiology Images displayed and confirmed. If images not available, report reviewed: imaging studies available  Patient identity confirmed: verbally with patient  Supporting Documentation  Indications: pain   Procedure Details  Location: hip - R greater trochanteric bursa  Needle size: 20 G (20G 3.5'')  Ultrasound guidance: no  Approach: Lateral to medial approach.  Medications administered: 80 mg triamcinolone acetonide 40 mg/mL; 10 mL ropivacaine 0.5 %    Patient tolerance: patient tolerated the procedure well with no immediate complications  Dressing:  Sterile dressing applied    A modified tenotomy was performed by redirecting the needle in three directions and dispersing the medication equally in all three directions.    There was little to no resistance encountered during the injection.    Risks of this procedure include:    - Risk of bleeding since a needle is involved.  - Risk of infection (1/10,000 chance as per recent studies).  Signs/symptoms were discussed and they would prompt an urgent evaluation at an emergency department.  - Risk of pigmentation or skin dimpling in the skin (2-3% chance as per recent studies) from the steroid.  - Risk of increased pain from steroid flare (1% chance as per recent studies) that typically lasts 24-48 hours.  - Risk of increased blood sugars from the steroid medication that can last for a few weeks.  If the patient is a  diabetic or pre-diabetic, they were encouraged to closely monitor their blood sugars and discuss with PCP if elevated more than usual or if having symptoms.    The benefits outweigh the risks and so the procedure was completed.

## 2025-07-16 ENCOUNTER — APPOINTMENT (OUTPATIENT)
Dept: RADIOLOGY | Facility: AMBULARY SURGERY CENTER | Age: 75
End: 2025-07-16
Attending: STUDENT IN AN ORGANIZED HEALTH CARE EDUCATION/TRAINING PROGRAM
Payer: COMMERCIAL

## 2025-07-16 ENCOUNTER — OFFICE VISIT (OUTPATIENT)
Dept: OBGYN CLINIC | Facility: CLINIC | Age: 75
End: 2025-07-16
Payer: COMMERCIAL

## 2025-07-16 VITALS — HEIGHT: 67 IN | BODY MASS INDEX: 24.48 KG/M2 | WEIGHT: 156 LBS

## 2025-07-16 DIAGNOSIS — M25.511 RIGHT SHOULDER PAIN, UNSPECIFIED CHRONICITY: ICD-10-CM

## 2025-07-16 DIAGNOSIS — M67.911 ROTATOR CUFF DYSFUNCTION, RIGHT: Primary | ICD-10-CM

## 2025-07-16 PROBLEM — S46.011A TRAUMATIC INCOMPLETE TEAR OF RIGHT ROTATOR CUFF: Status: ACTIVE | Noted: 2025-07-16

## 2025-07-16 PROCEDURE — 99213 OFFICE O/P EST LOW 20 MIN: CPT | Performed by: STUDENT IN AN ORGANIZED HEALTH CARE EDUCATION/TRAINING PROGRAM

## 2025-07-16 PROCEDURE — 73030 X-RAY EXAM OF SHOULDER: CPT

## 2025-07-16 PROCEDURE — 20610 DRAIN/INJ JOINT/BURSA W/O US: CPT | Performed by: STUDENT IN AN ORGANIZED HEALTH CARE EDUCATION/TRAINING PROGRAM

## 2025-07-16 RX ORDER — BETAMETHASONE SODIUM PHOSPHATE AND BETAMETHASONE ACETATE 3; 3 MG/ML; MG/ML
6 INJECTION, SUSPENSION INTRA-ARTICULAR; INTRALESIONAL; INTRAMUSCULAR; SOFT TISSUE
Status: COMPLETED | OUTPATIENT
Start: 2025-07-16 | End: 2025-07-16

## 2025-07-16 RX ORDER — LIDOCAINE HYDROCHLORIDE 10 MG/ML
4 INJECTION, SOLUTION EPIDURAL; INFILTRATION; INTRACAUDAL; PERINEURAL
Status: COMPLETED | OUTPATIENT
Start: 2025-07-16 | End: 2025-07-16

## 2025-07-16 RX ADMIN — BETAMETHASONE SODIUM PHOSPHATE AND BETAMETHASONE ACETATE 6 MG: 3; 3 INJECTION, SUSPENSION INTRA-ARTICULAR; INTRALESIONAL; INTRAMUSCULAR; SOFT TISSUE at 09:45

## 2025-07-16 RX ADMIN — LIDOCAINE HYDROCHLORIDE 4 ML: 10 INJECTION, SOLUTION EPIDURAL; INFILTRATION; INTRACAUDAL; PERINEURAL at 09:45

## 2025-07-16 NOTE — PROGRESS NOTES
[facility]  [address]  [address]  [place] [id number] [phone]       Lenora Jacinto  [id number]  1950    ORTHOPAEDIC SURGERY OUTPATIENT NOTE  7/16/2025      :  Assessment & Plan  Rotator cuff dysfunction, right  Discussed physical exam and imaging findings of the right shoulder at length with patient the office today.  Discussed finding significant for possible partial tear of the right shoulder secondary to recent injury with prior tendinitis and weakness on exam.   Discussed MRI versus steroid injection in the office today.  Discussed side effects and risks of steroid injection.  Patient agreeable and elected to proceed with steroid injection in the office today.  Persistent pain and inflammation or return/worsening of symptoms recommend MRI for further evaluation.   Patient will follow-up in 1 month, at this time we will reassess symptoms and consider possible MRI further evaluation.  Orders:    XR shoulder 2+ vw right; Future    Large joint arthrocentesis: R subacromial bursa        The patient's diagnosis and treatment were discussed at length today. We discussed no treatment, non-operative treatment, and operative treatment.    Large joint arthrocentesis: R subacromial bursa    Performed by: Srini Jose DO  Authorized by: Srini Jose DO    Universal Protocol:  Consent: Verbal consent obtained  Risks and benefits: risks, benefits and alternatives were discussed  Consent given by: patient  Patient understanding: patient states understanding of the procedure being performed  Patient identity confirmed: verbally with patient  Supporting Documentation  Indications: pain     Is this a Visco injection? NoProcedure Details  Location: shoulder - R subacromial bursa  Preparation: Patient was prepped and draped in the usual sterile fashion  Needle size: 22 G  Ultrasound guidance: no  Approach: anterolateral  Medications  "administered: 4 mL lidocaine (PF) 1 %; 6 mg betamethasone acetate-betamethasone sodium phosphate 6 (3-3) mg/mL    Patient tolerance: patient tolerated the procedure well with no immediate complications  Dressing:  Sterile dressing applied             Translation: No    HISTORY:  74 y.o. RHD female presents for evaluation of her right shoulder.  Patient notes ongoing right shoulder pain for several years with recent increase in the last couple of weeks.  Patient notes she is an avid golfer and  but has had to limit her activity secondary to pain.  She notes recent fall on an outstretched arm about 3 weeks ago with significant increase in pain.  She notes initially following injury she had significant pain and weakness with inability to raise her arm over her head.  However since this time pain has improved but still notes some moderate with overhead activity as well as lifting.  She notes about 3 prior injections to the subacromial space of the right shoulder with moderate relief in the past.  She also notes formal physical therapy with moderate relief.  She notes recent use of oral anti-inflammatories which has significantly decreased pain from onset of injury.  She denies numbness and tingling of the right upper extremity.    Surgical History:  None    Previous Injection(s): 3 prior CSI subacromial injections in [place]       The following portions of the patient's history were reviewed and updated as appropriate: allergies, current medications, past family history, past social history, past surgical history and problem list.    Ht 5' 7\" (1.702 m)   Wt 70.8 kg (156 lb)   BMI 24.43 kg/m²    No results found for: \"HGBA1C\"      Past Medical History[1]    Past Surgical History[2]    Social History     Socioeconomic History    Marital status: /Civil Union     Spouse name: Not on file    Number of children: Not on file    Years of education: Not on file    Highest education level: Not on file "   Occupational History    Not on file   Tobacco Use    Smoking status: Never    Smokeless tobacco: Never   Vaping Use    Vaping status: Never Used   Substance and Sexual Activity    Alcohol use: Not on file    Drug use: Not on file    Sexual activity: Not on file   Other Topics Concern    Not on file   Social History Narrative    Not on file     Social Drivers of Health     Financial Resource Strain: Not on file   Food Insecurity: Not on file   Transportation Needs: Not on file   Physical Activity: Not on file   Stress: Not on file   Social Connections: Not on file   Intimate Partner Violence: Not on file   Housing Stability: Not on file       Family History[3]     Patient's Medications   New Prescriptions    No medications on file   Previous Medications    ESCITALOPRAM (LEXAPRO) 20 MG TABLET    Take 20 mg by mouth in the morning.    MELOXICAM (MOBIC) 15 MG TABLET    Take 1 tablet (15 mg total) by mouth daily as needed for moderate pain   Modified Medications    No medications on file   Discontinued Medications    No medications on file       Allergies[4]       REVIEW OF SYSTEMS:  Constitutional: Negative.    HEENT: Negative.    Respiratory: Negative.    Skin: Negative.    Neurological: Negative.    Psychiatric/Behavioral: Negative.  Musculoskeletal: Negative except for that mentioned in the HPI.    Gen: No acute distress, resting comfortably in bed  HEENT: Eyes clear, moist mucus membranes, hearing intact  Respiratory: No audible wheezing or stridor  Cardiovascular: Well Perfused peripherally, 2+ distal pulse  Abdomen: nondistended, no peritoneal signs     PHYSICAL EXAM:      Right Shoulder Exam  Alignment / Posture:  Normal scapular position.  Inspection:  No swelling. No erythema. No ecchymosis.  Palpation:  Mild tenderness at biceps tendon.  ROM:  Forward flexion 100 degrees, ER 50 degrees   Strength:  Supraspinatus 5/5. Infraspinatus 4/5. Subscapularis 5/5. Forward flexion 5/5. External rotation 4/5.  "  Stability:  Not tested.  Tests: (+) Hensley. (+) Speed. (-) Drop arm. (-) Belly press.  Neurovascular:  Sensation intact in DP/SP/Martins/Sa/T nerve distributions. 2+ DP & PT pulses.    IMAGING:  XR of right shoulder - mild degenerative changes of the glenohumeral joint.  No acute fracture or dislocation.  No acute osseous abnormality.. I have reviewed the radiology report(s) and do not currently have a radiology reading from [facility], but will check the result once the reading is performed.      Electronic Medical Records were reviewed including Medications/Labs/Past Medical History     Elli Manzo PA-C    Scribe Attestation      I,:  Elli Manzo PA-C am acting as a scribe while in the presence of the attending physician.:       I,:  Srini Jose, DO personally performed the services described in this documentation    as scribed in my presence.:               Portions of the record may have been created with voice recognition software.  Occasional wrong word or \"sound a like\" substitutions may have occurred due to the inherent limitations of voice recognition software.  Read the chart carefully and recognize, using context, where substitutions have occurred. All patient's questions were answered to their satisfaction.          [1] No past medical history on file.  [2] No past surgical history on file.  [3] No family history on file.  [4]   Allergies  Allergen Reactions    Isothiazolinone Chloride Itching and Swelling     "

## 2025-07-16 NOTE — ASSESSMENT & PLAN NOTE
Discussed physical exam and imaging findings of the right shoulder at length with patient the office today.  Discussed finding significant for possible partial tear of the right shoulder secondary to recent injury with prior tendinitis and weakness on exam.   Discussed MRI versus steroid injection in the office today.  Discussed side effects and risks of steroid injection.  Patient agreeable and elected to proceed with steroid injection in the office today.  Persistent pain and inflammation or return/worsening of symptoms recommend MRI for further evaluation.   Patient will follow-up in 1 month, at this time we will reassess symptoms and consider possible MRI further evaluation.  Orders:    XR shoulder 2+ vw right; Future    Large joint arthrocentesis: R subacromial bursa